# Patient Record
Sex: MALE | Race: WHITE | NOT HISPANIC OR LATINO | Employment: FULL TIME | URBAN - METROPOLITAN AREA
[De-identification: names, ages, dates, MRNs, and addresses within clinical notes are randomized per-mention and may not be internally consistent; named-entity substitution may affect disease eponyms.]

---

## 2017-08-23 ENCOUNTER — HOSPITAL ENCOUNTER (EMERGENCY)
Facility: OTHER | Age: 37
Discharge: LEFT BEFORE DISCHARGE | End: 2017-08-23
Attending: EMERGENCY MEDICINE

## 2017-08-23 VITALS
BODY MASS INDEX: 23.8 KG/M2 | DIASTOLIC BLOOD PRESSURE: 78 MMHG | HEIGHT: 71 IN | SYSTOLIC BLOOD PRESSURE: 143 MMHG | RESPIRATION RATE: 18 BRPM | HEART RATE: 81 BPM | TEMPERATURE: 98 F | WEIGHT: 170 LBS | OXYGEN SATURATION: 99 %

## 2017-08-23 DIAGNOSIS — R05.9 COUGH: Primary | ICD-10-CM

## 2017-08-23 PROCEDURE — 99282 EMERGENCY DEPT VISIT SF MDM: CPT

## 2017-08-23 NOTE — ED PROVIDER NOTES
Encounter Date: 8/23/2017    SCRIBE #1 NOTE: I, Sean Hansen, am scribing for, and in the presence of,  Sharad Enrique MD. I have scribed the entire note.       History     Chief Complaint   Patient presents with    Cough     pt  with cough and congestion and face swelling x 3 days.     Time patient was seen by the provider: 5:57 PM      The patient is a 36 y.o. male with hx of: inguinal hernia repair that presents to the ED with a complaint of 3 days of nasal congestion and cough. This has been persistent with green mucususually worse at night while laying down. He reports additional malaise, and subjective fever. He has no shortness of breath.       No alcohol for 7 months, no drugs,           Review of patient's allergies indicates:  No Known Allergies  History reviewed. No pertinent past medical history.  Past Surgical History:   Procedure Laterality Date    INGUINAL HERNIA REPAIR Right      History reviewed. No pertinent family history.  Social History   Substance Use Topics    Smoking status: Current Every Day Smoker     Packs/day: 0.50    Smokeless tobacco: Never Used    Alcohol use Yes      Comment: occasional     Review of Systems   Constitutional: Positive for fever.   HENT: Positive for congestion. Negative for rhinorrhea and sore throat.    Respiratory: Positive for cough. Negative for shortness of breath.    Cardiovascular: Negative for chest pain.   Gastrointestinal: Negative for nausea and vomiting.   Skin: Negative for rash.       Physical Exam     Initial Vitals [08/23/17 1737]   BP Pulse Resp Temp SpO2   (!) 143/78 81 18 98.4 °F (36.9 °C) 99 %      MAP       99.67         Physical Exam    Nursing note and vitals reviewed.  Constitutional: He appears well-developed and well-nourished.   HENT:   Head: Normocephalic and atraumatic.   Mouth/Throat: Oropharynx is clear and moist.   Eyes: EOM are normal. Pupils are equal, round, and reactive to light.   Cardiovascular: Normal rate, regular rhythm  and normal heart sounds.   Pulmonary/Chest: Breath sounds normal. No respiratory distress. He has no wheezes. He has no rhonchi. He has no rales.   Lymphadenopathy:     He has no cervical adenopathy.   Neurological: He is alert and oriented to person, place, and time.   Skin: Skin is warm and dry.         ED Course   Procedures  Labs Reviewed - No data to display          Medical Decision Making:   ED Management:  6:48 PM pt has eloped.             Scribe Attestation:   Scribe #1: I performed the above scribed service and the documentation accurately describes the services I performed. I attest to the accuracy of the note.    Attending Attestation:           Physician Attestation for Scribe:  Physician Attestation Statement for Scribe #1: I, Sharad Enrique MD, reviewed documentation, as scribed by Sean Hansen in my presence, and it is both accurate and complete.         Attending ED Notes:   Patient eloped from the emergency department without M.D. knowledge.          ED Course     Clinical Impression:     1. Cough            Disposition:   Disposition: Eloped  Condition: Stable                        Sharad Enrique MD  08/23/17 [id number]

## 2017-08-23 NOTE — ED TRIAGE NOTES
"C/o productive cough with "green" sputum x 2-3 days. Reports feeling of "swelling" to face. Intermittent nasal congestion and drainage.  "

## 2017-08-23 NOTE — ED NOTES
Pt states he does not need an x ray and that he explained that already to the MD. He states the x ray is unneccessary and he is not sitting here waiting for an x ray.